# Patient Record
Sex: MALE | Race: WHITE | NOT HISPANIC OR LATINO | Employment: FULL TIME | URBAN - METROPOLITAN AREA
[De-identification: names, ages, dates, MRNs, and addresses within clinical notes are randomized per-mention and may not be internally consistent; named-entity substitution may affect disease eponyms.]

---

## 2023-09-08 ENCOUNTER — OFFICE VISIT (OUTPATIENT)
Dept: OTOLARYNGOLOGY | Facility: CLINIC | Age: 33
End: 2023-09-08

## 2023-09-08 DIAGNOSIS — Z00.6 EXAMINATION OF PARTICIPANT OR CONTROL IN CLINICAL RESEARCH: Primary | ICD-10-CM

## 2023-09-08 PROCEDURE — 99207 PR NO CHARGE LOS: CPT

## 2023-09-08 NOTE — PROGRESS NOTES
This individual participated in a research visit (IRB#[id number]) as a healthy control. Informed consent for participation was completed prior to today's visit. Following audio recording of stimulus sentences, lidocaine HCL 2% with Oxymetazoline HCL 0.025% was administered to the right and left nostril. A high-resolution manometry catheter was passed transasally to the proximal esophagus. Placement was confirmed via visual inspection of the real-time spatiotemporal plot of pressure data. The participant completed the tasks and the catheter was removed. No known complications occurred in association with this procedure.    Research coordinator contact: [phone].   PI contact: [phone]    Liban Pulliam, Ph.D., CCC-SLP  , Otolaryngology  Speech-Language Pathologist-[facility]  [phone]  he/him/his